# Patient Record
Sex: MALE | Race: WHITE | NOT HISPANIC OR LATINO | Employment: OTHER | URBAN - METROPOLITAN AREA
[De-identification: names, ages, dates, MRNs, and addresses within clinical notes are randomized per-mention and may not be internally consistent; named-entity substitution may affect disease eponyms.]

---

## 2023-06-13 ENCOUNTER — APPOINTMENT (OUTPATIENT)
Dept: NON INVASIVE DIAGNOSTICS | Facility: HOSPITAL | Age: 63
End: 2023-06-13
Payer: COMMERCIAL

## 2023-06-13 ENCOUNTER — HOSPITAL ENCOUNTER (OUTPATIENT)
Facility: HOSPITAL | Age: 63
Setting detail: OBSERVATION
Discharge: HOME | End: 2023-06-13
Attending: EMERGENCY MEDICINE | Admitting: HOSPITALIST
Payer: COMMERCIAL

## 2023-06-13 ENCOUNTER — APPOINTMENT (EMERGENCY)
Dept: RADIOLOGY | Facility: HOSPITAL | Age: 63
End: 2023-06-13
Payer: COMMERCIAL

## 2023-06-13 VITALS
HEART RATE: 98 BPM | SYSTOLIC BLOOD PRESSURE: 116 MMHG | TEMPERATURE: 98.2 F | BODY MASS INDEX: 26.95 KG/M2 | WEIGHT: 210 LBS | RESPIRATION RATE: 18 BRPM | DIASTOLIC BLOOD PRESSURE: 92 MMHG | OXYGEN SATURATION: 95 % | HEIGHT: 74 IN

## 2023-06-13 DIAGNOSIS — I48.91 ATRIAL FIBRILLATION WITH RAPID VENTRICULAR RESPONSE (HCC): Primary | ICD-10-CM

## 2023-06-13 LAB
2HR DELTA HS TROPONIN: 1 NG/L
4HR DELTA HS TROPONIN: 1 NG/L
ALBUMIN SERPL BCP-MCNC: 4.3 G/DL (ref 3.5–5)
ALP SERPL-CCNC: 53 U/L (ref 34–104)
ALT SERPL W P-5'-P-CCNC: 35 U/L (ref 7–52)
ANION GAP SERPL CALCULATED.3IONS-SCNC: 6 MMOL/L (ref 4–13)
ANION GAP SERPL CALCULATED.3IONS-SCNC: 8 MMOL/L (ref 4–13)
AORTIC ROOT: 3.5 CM
AORTIC VALVE MEAN VELOCITY: 7 M/S
APICAL FOUR CHAMBER EJECTION FRACTION: 61 %
ASCENDING AORTA: 3.2 CM
AST SERPL W P-5'-P-CCNC: 30 U/L (ref 13–39)
ATRIAL RATE: 120 BPM
AV AREA BY CONTINUOUS VTI: 2.5 CM2
AV AREA PEAK VELOCITY: 2.8 CM2
AV LVOT MEAN GRADIENT: 2 MMHG
AV LVOT PEAK GRADIENT: 4 MMHG
AV MEAN GRADIENT: 3 MMHG
AV PEAK GRADIENT: 5 MMHG
AV VALVE AREA: 2.48 CM2
AV VELOCITY RATIO: 0.9
BASOPHILS # BLD AUTO: 0.04 THOUSANDS/ÂΜL (ref 0–0.1)
BASOPHILS NFR BLD AUTO: 1 % (ref 0–1)
BILIRUB SERPL-MCNC: 0.48 MG/DL (ref 0.2–1)
BUN SERPL-MCNC: 17 MG/DL (ref 5–25)
BUN SERPL-MCNC: 18 MG/DL (ref 5–25)
CALCIUM SERPL-MCNC: 8.2 MG/DL (ref 8.4–10.2)
CALCIUM SERPL-MCNC: 8.8 MG/DL (ref 8.4–10.2)
CARDIAC TROPONIN I PNL SERPL HS: 4 NG/L
CARDIAC TROPONIN I PNL SERPL HS: 5 NG/L
CARDIAC TROPONIN I PNL SERPL HS: 5 NG/L
CHLORIDE SERPL-SCNC: 107 MMOL/L (ref 96–108)
CHLORIDE SERPL-SCNC: 112 MMOL/L (ref 96–108)
CO2 SERPL-SCNC: 22 MMOL/L (ref 21–32)
CO2 SERPL-SCNC: 25 MMOL/L (ref 21–32)
CREAT SERPL-MCNC: 0.89 MG/DL (ref 0.6–1.3)
CREAT SERPL-MCNC: 1.19 MG/DL (ref 0.6–1.3)
DOP CALC AO PEAK VEL: 1.14 M/S
DOP CALC AO VTI: 21.02 CM
DOP CALC LVOT AREA: 3.14 CM2
DOP CALC LVOT DIAMETER: 2 CM
DOP CALC LVOT PEAK VEL VTI: 16.58 CM
DOP CALC LVOT PEAK VEL: 1.03 M/S
DOP CALC LVOT STROKE INDEX: 22.5 ML/M2
DOP CALC LVOT STROKE VOLUME: 52.06
EOSINOPHIL # BLD AUTO: 0.09 THOUSAND/ÂΜL (ref 0–0.61)
EOSINOPHIL NFR BLD AUTO: 2 % (ref 0–6)
ERYTHROCYTE [DISTWIDTH] IN BLOOD BY AUTOMATED COUNT: 13.2 % (ref 11.6–15.1)
FRACTIONAL SHORTENING: 35 (ref 28–44)
GFR SERPL CREATININE-BSD FRML MDRD: 64 ML/MIN/1.73SQ M
GFR SERPL CREATININE-BSD FRML MDRD: 90 ML/MIN/1.73SQ M
GLUCOSE SERPL-MCNC: 118 MG/DL (ref 65–140)
GLUCOSE SERPL-MCNC: 99 MG/DL (ref 65–140)
HCT VFR BLD AUTO: 46.7 % (ref 36.5–49.3)
HGB BLD-MCNC: 16.2 G/DL (ref 12–17)
IMM GRANULOCYTES # BLD AUTO: 0.01 THOUSAND/UL (ref 0–0.2)
IMM GRANULOCYTES NFR BLD AUTO: 0 % (ref 0–2)
INTERVENTRICULAR SEPTUM IN DIASTOLE (PARASTERNAL SHORT AXIS VIEW): 1.2 CM
INTERVENTRICULAR SEPTUM: 1.2 CM (ref 0.6–1.1)
LAAS-AP2: 20.8 CM2
LAAS-AP4: 19.8 CM2
LEFT ATRIUM SIZE: 3.5 CM
LEFT INTERNAL DIMENSION IN SYSTOLE: 2.8 CM (ref 2.1–4)
LEFT VENTRICULAR INTERNAL DIMENSION IN DIASTOLE: 4.3 CM (ref 3.5–6)
LEFT VENTRICULAR POSTERIOR WALL IN END DIASTOLE: 1.1 CM
LEFT VENTRICULAR STROKE VOLUME: 51 ML
LVSV (TEICH): 51 ML
LYMPHOCYTES # BLD AUTO: 2.41 THOUSANDS/ÂΜL (ref 0.6–4.47)
LYMPHOCYTES NFR BLD AUTO: 40 % (ref 14–44)
MAGNESIUM SERPL-MCNC: 2.3 MG/DL (ref 1.9–2.7)
MCH RBC QN AUTO: 31.2 PG (ref 26.8–34.3)
MCHC RBC AUTO-ENTMCNC: 34.7 G/DL (ref 31.4–37.4)
MCV RBC AUTO: 90 FL (ref 82–98)
MONOCYTES # BLD AUTO: 0.5 THOUSAND/ÂΜL (ref 0.17–1.22)
MONOCYTES NFR BLD AUTO: 8 % (ref 4–12)
NEUTROPHILS # BLD AUTO: 2.99 THOUSANDS/ÂΜL (ref 1.85–7.62)
NEUTS SEG NFR BLD AUTO: 49 % (ref 43–75)
NRBC BLD AUTO-RTO: 0 /100 WBCS
PLATELET # BLD AUTO: 207 THOUSANDS/UL (ref 149–390)
PMV BLD AUTO: 9.9 FL (ref 8.9–12.7)
POTASSIUM SERPL-SCNC: 3.5 MMOL/L (ref 3.5–5.3)
POTASSIUM SERPL-SCNC: 4.2 MMOL/L (ref 3.5–5.3)
PROT SERPL-MCNC: 6.8 G/DL (ref 6.4–8.4)
QRS AXIS: 59 DEGREES
QRSD INTERVAL: 96 MS
QT INTERVAL: 296 MS
QTC INTERVAL: 425 MS
RBC # BLD AUTO: 5.2 MILLION/UL (ref 3.88–5.62)
RIGHT ATRIUM AREA SYSTOLE A4C: 14.7 CM2
RIGHT VENTRICLE ID DIMENSION: 3.2 CM
SL CV LEFT ATRIUM LENGTH A2C: 5.8 CM
SL CV LV EF: 60
SL CV PED ECHO LEFT VENTRICLE DIASTOLIC VOLUME (MOD BIPLANE) 2D: 81 ML
SL CV PED ECHO LEFT VENTRICLE SYSTOLIC VOLUME (MOD BIPLANE) 2D: 30 ML
SODIUM SERPL-SCNC: 140 MMOL/L (ref 135–147)
SODIUM SERPL-SCNC: 140 MMOL/L (ref 135–147)
T WAVE AXIS: 36 DEGREES
TR MAX PG: 21 MMHG
TR PEAK VELOCITY: 2.3 M/S
TRICUSPID ANNULAR PLANE SYSTOLIC EXCURSION: 1.9 CM
TRICUSPID VALVE PEAK REGURGITATION VELOCITY: 2.27 M/S
TSH SERPL DL<=0.05 MIU/L-ACNC: 3.86 UIU/ML (ref 0.45–4.5)
VENTRICULAR RATE: 124 BPM
WBC # BLD AUTO: 6.04 THOUSAND/UL (ref 4.31–10.16)

## 2023-06-13 PROCEDURE — 99285 EMERGENCY DEPT VISIT HI MDM: CPT

## 2023-06-13 PROCEDURE — 93306 TTE W/DOPPLER COMPLETE: CPT | Performed by: INTERNAL MEDICINE

## 2023-06-13 PROCEDURE — 96361 HYDRATE IV INFUSION ADD-ON: CPT

## 2023-06-13 PROCEDURE — 80048 BASIC METABOLIC PNL TOTAL CA: CPT | Performed by: PHYSICIAN ASSISTANT

## 2023-06-13 PROCEDURE — 83735 ASSAY OF MAGNESIUM: CPT | Performed by: EMERGENCY MEDICINE

## 2023-06-13 PROCEDURE — 93005 ELECTROCARDIOGRAM TRACING: CPT

## 2023-06-13 PROCEDURE — 84443 ASSAY THYROID STIM HORMONE: CPT | Performed by: PHYSICIAN ASSISTANT

## 2023-06-13 PROCEDURE — 99223 1ST HOSP IP/OBS HIGH 75: CPT | Performed by: HOSPITALIST

## 2023-06-13 PROCEDURE — 36415 COLL VENOUS BLD VENIPUNCTURE: CPT

## 2023-06-13 PROCEDURE — 71046 X-RAY EXAM CHEST 2 VIEWS: CPT

## 2023-06-13 PROCEDURE — 99245 OFF/OP CONSLTJ NEW/EST HI 55: CPT | Performed by: INTERNAL MEDICINE

## 2023-06-13 PROCEDURE — 93306 TTE W/DOPPLER COMPLETE: CPT

## 2023-06-13 PROCEDURE — 80053 COMPREHEN METABOLIC PANEL: CPT | Performed by: EMERGENCY MEDICINE

## 2023-06-13 PROCEDURE — 85025 COMPLETE CBC W/AUTO DIFF WBC: CPT | Performed by: EMERGENCY MEDICINE

## 2023-06-13 PROCEDURE — NC001 PR NO CHARGE: Performed by: HOSPITALIST

## 2023-06-13 PROCEDURE — 93010 ELECTROCARDIOGRAM REPORT: CPT | Performed by: INTERNAL MEDICINE

## 2023-06-13 PROCEDURE — 84484 ASSAY OF TROPONIN QUANT: CPT | Performed by: EMERGENCY MEDICINE

## 2023-06-13 PROCEDURE — 96360 HYDRATION IV INFUSION INIT: CPT

## 2023-06-13 RX ORDER — ONDANSETRON 2 MG/ML
4 INJECTION INTRAMUSCULAR; INTRAVENOUS EVERY 6 HOURS PRN
Status: DISCONTINUED | OUTPATIENT
Start: 2023-06-13 | End: 2023-06-13 | Stop reason: HOSPADM

## 2023-06-13 RX ORDER — ENOXAPARIN SODIUM 100 MG/ML
40 INJECTION SUBCUTANEOUS DAILY
Status: DISCONTINUED | OUTPATIENT
Start: 2023-06-13 | End: 2023-06-13

## 2023-06-13 RX ORDER — METOPROLOL SUCCINATE 50 MG/1
50 TABLET, EXTENDED RELEASE ORAL DAILY
Qty: 30 TABLET | Refills: 0 | Status: SHIPPED | OUTPATIENT
Start: 2023-06-14 | End: 2023-07-14

## 2023-06-13 RX ORDER — ACETAMINOPHEN 325 MG/1
650 TABLET ORAL EVERY 4 HOURS PRN
Status: DISCONTINUED | OUTPATIENT
Start: 2023-06-13 | End: 2023-06-13 | Stop reason: HOSPADM

## 2023-06-13 RX ORDER — SODIUM CHLORIDE 9 MG/ML
125 INJECTION, SOLUTION INTRAVENOUS CONTINUOUS
Status: DISCONTINUED | OUTPATIENT
Start: 2023-06-13 | End: 2023-06-13

## 2023-06-13 RX ORDER — POTASSIUM CHLORIDE 20 MEQ/1
40 TABLET, EXTENDED RELEASE ORAL ONCE
Status: COMPLETED | OUTPATIENT
Start: 2023-06-13 | End: 2023-06-13

## 2023-06-13 RX ORDER — METOPROLOL SUCCINATE 50 MG/1
50 TABLET, EXTENDED RELEASE ORAL DAILY
Status: DISCONTINUED | OUTPATIENT
Start: 2023-06-13 | End: 2023-06-13 | Stop reason: HOSPADM

## 2023-06-13 RX ADMIN — POTASSIUM CHLORIDE 40 MEQ: 1500 TABLET, EXTENDED RELEASE ORAL at 04:26

## 2023-06-13 RX ADMIN — METOPROLOL SUCCINATE 50 MG: 50 TABLET, EXTENDED RELEASE ORAL at 10:24

## 2023-06-13 RX ADMIN — SODIUM CHLORIDE 1000 ML: 0.9 INJECTION, SOLUTION INTRAVENOUS at 02:20

## 2023-06-13 RX ADMIN — ENOXAPARIN SODIUM 40 MG: 40 INJECTION SUBCUTANEOUS at 09:10

## 2023-06-13 RX ADMIN — SODIUM CHLORIDE 125 ML/HR: 0.9 INJECTION, SOLUTION INTRAVENOUS at 04:23

## 2023-06-13 NOTE — CASE MANAGEMENT
Case Management Progress Note    Patient name Makeda Brantley  Location ED 23/ED 23 MRN [id number]  : 1960 Date 2023       LOS (days): 0  Geometric Mean LOS (GMLOS) (days):   Days to GMLOS:        OBJECTIVE:        Current admission status: Observation  Preferred Pharmacy:   [address] [place] [address]  Phone: [phone] Fax: [phone]    Primary Care Provider: No primary care provider on file  Primary Insurance: BLUE CROSS  Secondary Insurance:     PROGRESS NOTE:    Message from [address] that patient is medically cleared and will need Eliquis  Call to [facility] cost is $47 00 a month  Met with patient at bedside in ED and discussed Eliquis and provided with $10 coupon  Spouse will transport home    No other discharge needs

## 2023-06-13 NOTE — DISCHARGE INSTR - AVS FIRST PAGE
Dear Silva Ledezma,     It was our pleasure to care for you here at [facility], [address] [place] [address]  It is our hope that we were always able to exceed the expected standards for your care during your stay  You were hospitalized due to atrial fibrillation with rapid ventricular response  You were cared for on the medical/surgical floor by Anabel Tsang MD with the [facility] who covers for your primary care physician (PCP), No primary care provider on file  , while you were hospitalized  You were additionally seen by the [place] [address] [facility]-Dr Gregorio Cormier  If you have any questions or concerns related to this hospitalization, you may contact us at [phone]  For follow up as well as any medication refills, we recommend that you follow up with your primary care physician  A registered nurse will reach out to you by phone within a few days after your discharge to answer any additional questions that you may have after going home  However, at this time we provide for you here, the most important instructions / recommendations at discharge:     Notable Medication Adjustments -   New prescription Toprol-XL-50 mg-take 1 tablet once a day  New prescription Eliquis-5 mg-take 1 tablet twice a  Testing Required after Discharge -   To be further determined in the near future in the outpatient setting by cardiology  Important follow up information -   Please follow-up with the providers as outlined in this discharge packet  Other Instructions -   Please maintain a healthy low-sodium diet  Please review this entire after visit summary as additional general instructions including medication list, appointments, activity, diet, any pertinent wound care, and other additional recommendations from your care team that may be provided for you        Sincerely,     Anabel Tsang MD

## 2023-06-13 NOTE — CONSULTS
[address]  Consult  Name: Theodore Bashir 60 y o  male I MRN: [id number]  Unit/Bed#: ED 23 I Date of Admission: 6/13/2023   Date of Service: 6/13/2023 I Hospital Day: 0    Inpatient consult to Cardiology  Consult performed by: FIFI Pavon  Consult ordered by: Olimpia Nuñez PA-C          Assessment/Plan   * Atrial fibrillation with RVR [facility])  Assessment & Plan  Rate now well controlled without medications  Labs unremarkable for electrolyte abnormalities, infection, thyroid disease  Trop levels normal, Echo without wma    Start metoprolol 50 mg daily   Chads Vasc 0 low stroke risk  Recommend short term anticoagulation with plan for CV vs ablation after outpatient reassessment in 1 month         Other summary comments: Ngoc Ariza remains in A-fib with a controlled ventricular response  Will initiate metoprolol 50 mg once daily for better rate control  Given his young age, we will consider cardioversion vs ablation after 4 weeks of uninterrupted anticoagulation  Eliquis 5 mg bid has been recommended for the short term  Will make arrangements for outpatient follow up  Pt is stable for DC from a cardiac perspective  Outpatient Cardiologist: None    HPI: Theodore Bashir is a 60y o  year old male who presented with afib  Ngoc Ariza has a remote history of A-fib, though by his description, it may have been SVT  Ngoc Ariza noted palpitations after returning to bed after using the bathroom  He has a cardia monitor and checked his heart rate and was noted to be in A-fib  Other than palpitations, he denied any other symptoms  It is for this reason he presented for evaluation  At the time of my evaluation, he notes mild palpitations  He denies any chest pain or recent episodes of chest discomfort, changes in stamina/functional capacity, exertional dyspnea  He denies dizziness, lightheadedness, syncope  He denies any recent changes in medication    He "does consume a lot of caffeine, but denies any extra yesterday  He consumes 2 beers most days of the week and denies excess alcohol intake recently  He does occasionally take a decongestant for allergies  EKG: A-fib with RVR, PVCs or aberrantly conducted complexes      MOST  RECENT CARDIAC IMAGING:   Echo 6/13/2023  EF 60%  Mild LAE  Mild MR, TR      Review of Systems: a 10 point review of systems was conducted and is negative except for as mentioned in the HPI or as below  Historical Information   Past Medical History:   Diagnosis Date   • Atrial fibrillation [facility])      History reviewed  No pertinent surgical history  Social History     Substance and Sexual Activity   Alcohol Use Not Currently     Social History     Substance and Sexual Activity   Drug Use Not Currently     Social History     Tobacco Use   Smoking Status Never   Smokeless Tobacco Never       Family History: family history of CAD      Meds/Allergies   all current active meds have been reviewed  (Not in a hospital admission)      No Known Allergies    Objective   Vitals: Blood pressure 113/80, pulse 104, temperature 98 2 °F (36 8 °C), temperature source Oral, resp  rate 16, height 6' 2\" (1 88 m), weight 95 3 kg (210 lb), SpO2 96 %  , Body mass index is 26 96 kg/m² ,   Orthostatic Blood Pressures    Flowsheet Row Most Recent Value   Blood Pressure 113/80 filed at 06/13/2023 0840   Patient Position - Orthostatic VS Lying filed at 06/13/2023 3079          Systolic (23OFB), ZLE:808 , Min:111 , ADQ:788     Diastolic (59WMR), EEL:79, Min:63, Max:81    Physical Exam:    General:  Normal appearance in no distress  Eyes:  Anicteric  Oral mucosa:  Moist   Neck:  No JVD  Carotid upstrokes are brisk without bruits  No masses  Chest:  Clear to auscultation  Cardiac:  Irregularly irregular  No palpable PMI  Normal S1 and S2  No murmur gallop or rub  Abdomen:  Soft and nontender  No palpable organomegaly or aortic enlargement    Extremities: " No peripheral edema  Musculoskeletal:  Symmetric  Vascular:  Femoral pulses are brisk without bruits  Popliteal  pulses are intact bilaterally  Pedal pulses are intact  Neuro:  Grossly symmetric  Psych:  Alert and oriented x3          Lab Results:     Troponins:    Results from last 7 days   Lab Units 06/13/23  0559 06/13/23  0428 06/13/23  0147   HS TNI 0HR ng/L  --   --  4   HS TNI 2HR ng/L  --  5  --    HS TNI 4HR ng/L 5  --   --    HSTNI D2 ng/L  --  1  --    HSTNI D4 ng/L 1  --   --      BNP:       CBC :   Results from last 7 days   Lab Units 06/13/23  0147   HEMATOCRIT % 46 7   HEMOGLOBIN g/dL 16 2   MCV fL 90   PLATELETS Thousands/uL 207   WBC Thousand/uL 6 04     TSH:     CMP:   Results from last 7 days   Lab Units 06/13/23  0559 06/13/23  0147   ALT U/L  --  35   AST U/L  --  30   BUN mg/dL 17 18   CHLORIDE mmol/L 112* 107   CO2 mmol/L 22 25   CREATININE mg/dL 0 89 1 19   EGFR ml/min/1 73sq m 90 64   POTASSIUM mmol/L 4 2 3 5     Lipid Profile:     Coags:

## 2023-06-13 NOTE — ASSESSMENT & PLAN NOTE
· Resolved, now very well rate controlled  · 2D echocardiogram reviewed-EF of 60%, no regional wall motion abnormalities, no significant structural valvular abnormality noted  · Status post a cardiology evaluation  · ACS has been ruled out  · TSH is within normal limits  · Patient is medically stable for discharge  · DC home on Toprol-XL for rate control  · DC home on apixaban for anticoagulation  · Patient to follow-up with cardiology in the near future in the outpatient setting for ablation evaluation  · Of note, the patient does not have a PCP, patient was counseled on the importance of establishing care with a local PCP to ensure he gets routine follow-up  · Patient verbalized understanding

## 2023-06-13 NOTE — ED PROVIDER NOTES
History  Chief Complaint   Patient presents with   • Rapid Heart Rate     Patient reports feeling rapid heart rate around 0000 today  Patient denies chest pain        60YEAR-OLD MALE     Pt brought by wife Lalit Soto, at bedside       PMH:   Afib 10 years ago, that was self limited and went away    Pt started w/ palpitations about 2 hours ago  He had just got up from sleep to pee and suddenly felt heart racing, felt like when he had episode of Afib 10 years ago     No syncope or near syncope    No cp/pressure  Denies Pain radiating to the Jaw/neck/back or arm     HE HAS NO SOB  PLEURISY, NO DIAPHORESIS    NO RECENT LONG CAR RIDES OR PLAN RIDES  NO H/O PE OR DVT  No leg swelling, no calf pain   NO RECENT TRAUMA OR SURGERY  NO HEMOPTYSIS      DENIES ETOH OR DRUG USE  DENIES STIMULANT USE  EXCESSIVE CAFFEINE USE, which is typical for him  He usually has 1-3 cups of coffee daily       ASSOCIATED SYMPTOMS:  URINARY  SYMPTOMS: THERE IS NO DYSURIA, NO HEMATURIA, NO FREQUENCY  NO ABDOMINAL PAIN   NO ACUTE BACK PAIN - REPORTS CHRONIC LBP     HE DENIES NAUSEA, BUT DENIES ANY VOMITING  DENIES FEVERS, CHILLS    DENIES LOOSE STOOLS, NO DIARRHEA, NO BLOODY STOOLS - NOT BLACK OR BLOODY      ALLEVIATING OR EXACERBATING FACTORS:  UNCERTAIN      INTERVENTIONS: NONE            History provided by:  Patient  Palpitations  Palpitations quality:  Irregular  Onset quality:  Sudden  Timing:  Constant  Progression:  Unchanged  Chronicity:  New  Relieved by:  Nothing  Worsened by:  Nothing  Ineffective treatments:  None tried  Associated symptoms: no back pain, no chest pain, no chest pressure, no cough, no diaphoresis, no dizziness, no hemoptysis, no leg pain, no lower extremity edema, no malaise/fatigue, no nausea, no near-syncope, no numbness, no PND, no shortness of breath, no syncope, no vomiting and no weakness        None       Past Medical History:   Diagnosis Date   • Atrial fibrillation ([facility] [address] )        History reviewed   No pertinent surgical history  History reviewed  No pertinent family history  I have reviewed and agree with the history as documented  E-Cigarette/Vaping     E-Cigarette/Vaping Substances     Social History     Tobacco Use   • Smoking status: Never   • Smokeless tobacco: Never   Substance Use Topics   • Alcohol use: Not Currently   • Drug use: Not Currently       Review of Systems   Constitutional: Negative for chills, diaphoresis, fatigue, fever and malaise/fatigue  HENT: Negative for congestion, ear discharge, ear pain, postnasal drip, rhinorrhea, sinus pressure, sinus pain, sneezing, sore throat, trouble swallowing and voice change  Respiratory: Negative for cough, hemoptysis, shortness of breath, wheezing and stridor  Cardiovascular: Positive for palpitations  Negative for chest pain, leg swelling, syncope, PND and near-syncope  Gastrointestinal: Negative for abdominal pain, blood in stool, constipation, nausea and vomiting  Genitourinary: Negative for difficulty urinating, dysuria, flank pain and frequency  Musculoskeletal: Negative for arthralgias, back pain, gait problem, joint swelling, myalgias, neck pain and neck stiffness  Skin: Negative for rash and wound  Neurological: Negative for dizziness, weakness, light-headedness, numbness and headaches  All other systems reviewed and are negative  Physical Exam  Physical Exam  Constitutional:       General: He is not in acute distress  Appearance: Normal appearance  He is well-developed  He is not ill-appearing, toxic-appearing or diaphoretic  HENT:      Head: Normocephalic and atraumatic  Right Ear: External ear normal       Left Ear: External ear normal       Nose: Nose normal  No congestion or rhinorrhea  Mouth/Throat:      Pharynx: No oropharyngeal exudate or posterior oropharyngeal erythema  Eyes:      General: No scleral icterus  Right eye: No discharge  Left eye: No discharge        Extraocular Movements: Extraocular movements intact  Conjunctiva/sclera: Conjunctivae normal       Pupils: Pupils are equal, round, and reactive to light  Neck:      Vascular: No JVD  Trachea: No tracheal deviation  Cardiovascular:      Rate and Rhythm: Tachycardia present  Rhythm irregular  Pulses: Normal pulses  Heart sounds: Normal heart sounds  No murmur heard  No friction rub  No gallop  Pulmonary:      Effort: Pulmonary effort is normal  No respiratory distress  Breath sounds: Normal breath sounds  No stridor  No wheezing, rhonchi or rales  Chest:      Chest wall: No tenderness  Abdominal:      General: Bowel sounds are normal  There is no distension  Palpations: Abdomen is soft  There is no mass  Tenderness: There is no abdominal tenderness  There is no right CVA tenderness, left CVA tenderness, guarding or rebound  Hernia: No hernia is present  Musculoskeletal:         General: No swelling, tenderness, deformity or signs of injury  Normal range of motion  Cervical back: Normal range of motion and neck supple  No rigidity or tenderness  Right lower leg: No edema  Left lower leg: No edema  Lymphadenopathy:      Cervical: No cervical adenopathy  Skin:     General: Skin is warm  Capillary Refill: Capillary refill takes less than 2 seconds  Coloration: Skin is not jaundiced or pale  Findings: No bruising, erythema, lesion or rash  Neurological:      General: No focal deficit present  Mental Status: He is alert and oriented to person, place, and time  Mental status is at baseline  Cranial Nerves: No cranial nerve deficit  Sensory: No sensory deficit  Motor: No weakness or abnormal muscle tone  Coordination: Coordination normal    Psychiatric:         Mood and Affect: Mood normal          Behavior: Behavior normal          Thought Content:  Thought content normal          Judgment: Judgment normal          Vital Signs  ED Triage Vitals [06/13/23 0143]   Temperature Pulse Respirations Blood Pressure SpO2   98 2 °F (36 8 °C) (!) 109 18 126/63 96 %      Temp Source Heart Rate Source Patient Position - Orthostatic VS BP Location FiO2 (%)   Oral Monitor Lying Left arm --      Pain Score       No Pain           Vitals:    06/13/23 0143 06/13/23 0300   BP: 126/63 111/81   Pulse: (!) 109 100   Patient Position - Orthostatic VS: Lying          Visual Acuity      ED Medications  Medications   acetaminophen (TYLENOL) tablet 650 mg (has no administration in time range)   ondansetron (ZOFRAN) injection 4 mg (has no administration in time range)   sodium chloride 0 9 % infusion (125 mL/hr Intravenous New Bag 6/13/23 0423)   enoxaparin (LOVENOX) subcutaneous injection 40 mg (has no administration in time range)   sodium chloride 0 9 % bolus 1,000 mL (0 mL Intravenous Stopped 6/13/23 0410)   potassium chloride (K-DUR,KLOR-CON) CR tablet 40 mEq (40 mEq Oral Given 6/13/23 0426)       Diagnostic Studies  Results Reviewed     Procedure Component Value Units Date/Time    HS Troponin I 2hr [[id number]]  (Normal) Collected: 06/13/23 0428    Lab Status: Final result Specimen: Blood from Line, Venous Updated: 06/13/23 0500     hs TnI 2hr 5 ng/L      Delta 2hr hsTnI 1 ng/L     TSH, 3rd generation with Free T4 reflex [[id number]]  (Normal) Collected: 06/13/23 0147    Lab Status: Final result Specimen: Blood from Arm, Right Updated: 06/13/23 0446     TSH 3RD GENERATON 3 863 uIU/mL     Basic metabolic panel [[id number]]     Lab Status: No result Specimen: Blood     HS Troponin I 4hr [[id number]]     Lab Status: No result Specimen: Blood     Magnesium [[id number]]  (Normal) Collected: 06/13/23 0147    Lab Status: Final result Specimen: Blood from Arm, Right Updated: 06/13/23 0244     Magnesium 2 3 mg/dL     HS Troponin 0hr (reflex protocol) [[id number]]  (Normal) Collected: 06/13/23 0147    Lab Status: Final result Specimen: Blood from Arm, Right Updated: 06/13/23 9256 hs TnI 0hr 4 ng/L     Comprehensive metabolic panel [[id number]] Collected: 06/13/23 0147    Lab Status: Final result Specimen: Blood from Arm, Right Updated: 06/13/23 0215     Sodium 140 mmol/L      Potassium 3 5 mmol/L      Chloride 107 mmol/L      CO2 25 mmol/L      ANION GAP 8 mmol/L      BUN 18 mg/dL      Creatinine 1 19 mg/dL      Glucose 99 mg/dL      Calcium 8 8 mg/dL      AST 30 U/L      ALT 35 U/L      Alkaline Phosphatase 53 U/L      Total Protein 6 8 g/dL      Albumin 4 3 g/dL      Total Bilirubin 0 48 mg/dL      eGFR 64 ml/min/1 73sq m     Narrative:      [place] guidelines for Chronic Kidney Disease (CKD):   •  Stage 1 with normal or high GFR (GFR > 90 mL/min/1 73 square meters)  •  Stage 2 Mild CKD (GFR = 60-89 mL/min/1 73 square meters)  •  Stage 3A Moderate CKD (GFR = 45-59 mL/min/1 73 square meters)  •  Stage 3B Moderate CKD (GFR = 30-44 mL/min/1 73 square meters)  •  Stage 4 Severe CKD (GFR = 15-29 mL/min/1 73 square meters)  •  Stage 5 End Stage CKD (GFR <15 mL/min/1 73 square meters)  Note: GFR calculation is accurate only with a steady state creatinine    CBC and differential [[id number]] Collected: 06/13/23 0147    Lab Status: Final result Specimen: Blood from Arm, Right Updated: 06/13/23 0155     WBC 6 04 Thousand/uL      RBC 5 20 Million/uL      Hemoglobin 16 2 g/dL      Hematocrit 46 7 %      MCV 90 fL      MCH 31 2 pg      MCHC 34 7 g/dL      RDW 13 2 %      MPV 9 9 fL      Platelets 371 Thousands/uL      nRBC 0 /100 WBCs      Neutrophils Relative 49 %      Immat GRANS % 0 %      Lymphocytes Relative 40 %      Monocytes Relative 8 %      Eosinophils Relative 2 %      Basophils Relative 1 %      Neutrophils Absolute 2 99 Thousands/µL      Immature Grans Absolute 0 01 Thousand/uL      Lymphocytes Absolute 2 41 Thousands/µL      Monocytes Absolute 0 50 Thousand/µL      Eosinophils Absolute 0 09 Thousand/µL      Basophils Absolute 0 04 Thousands/µL XR chest 2 views   ED Interpretation by Radha Tesfaye MD (06/13 0250)   COMPARISON: NONE    EXAM PERFORMED/VIEWS:  XR CHEST PA/LAT        FINDINGS:     Cardiomediastinal silhouette appears unremarkable      The lungs are clear  No pneumothorax or pleural effusion      Visualized osseous structures appear unremarkable for the patient's age      IMPRESSION:     No focal consolidation, pleural effusion, or pneumothorax                             Procedures  ECG 12 Lead Documentation Only    Date/Time: 6/13/2023 1:45 AM    Performed by: Radha Tesfaye MD  Authorized by: Radha Tesfaye MD    Indications / Diagnosis:  Palpitations   Patient location:  ED  Interpretation:     Interpretation: abnormal    Rate:     ECG rate:  124    ECG rate assessment: tachycardic    Rhythm:     Rhythm: atrial fibrillation    Ectopy:     Ectopy: none    QRS:     QRS axis:  Normal    QRS intervals:  Normal  Conduction:     Conduction: normal    ST segments:     ST segments:  Non-specific  T waves:     T waves: non-specific               ED Course  ED Course as of 06/13/23 0506   Tue Jun 13, 2023   0159 CBC and differential   0227 hs TnI 0hr: 4   0227 Comprehensive metabolic panel   5250 Magnesium: 2 3   0340 Tabitha accepts to Obs for new onset Afib    Pt aware of plan                                SBIRT 20yo+    Flowsheet Row Most Recent Value   Initial Alcohol Screen: US AUDIT-C     1  How often do you have a drink containing alcohol? 0 Filed at: 06/13/2023 0142   2  How many drinks containing alcohol do you have on a typical day you are drinking? 0 Filed at: 06/13/2023 0142   3a  Male UNDER 65: How often do you have five or more drinks on one occasion? 0 Filed at: 06/13/2023 0142   3b  FEMALE Any Age, or MALE 65+: How often do you have 4 or more drinks on one occassion? 0 Filed at: 06/13/2023 0142   Audit-C Score 0 Filed at: 06/13/2023 0142   ANH: How many times in the past year have you        Used an illegal drug or used a prescription medication for non-medical reasons? Never Filed at: 06/13/2023 0142                    Medical Decision Making      MDM    Patient with history as above presented with Patient presents with:  Rapid Heart Rate: Patient reports feeling rapid heart rate around 0000 today  Patient denies chest pain     History obtained from patient, spouse    Patient was nontoxic, stable  Exam as above    EKG reviewed  Labs reviewed  Independently reviewed CXR    Differential diagnosis considered  Overall presentation is consistent with new onset Atrial Fib w/ RVR  Low suspicion for ACS, sepsis  Patient was treated with IVF    Discussed case with TORSTEN, who agreed to admit patient for New onset Atrial Fib w/ RVR      This medical documentation was created using an electronic medical record system with [employer] voice recognition  Although this document has been carefully reviewed, there may still be some phonetic and typographical errors  These errors are purely typographical and due to imperfections of the software program, do not reflect any compromise in the patient's medical care  Atrial fibrillation with rapid ventricular response ([address] ): acute illness or injury  Amount and/or Complexity of Data Reviewed  Labs: ordered  Decision-making details documented in ED Course  Radiology: ordered and independent interpretation performed  Risk  Decision regarding hospitalization            Disposition  Final diagnoses:   Atrial fibrillation with rapid ventricular response ([address] )     Time reflects when diagnosis was documented in both MDM as applicable and the Disposition within this note     Time User Action Codes Description Comment    6/13/2023  3:58 AM [facility] [[id number]] Atrial fibrillation with rapid ventricular response [facility])       ED Disposition     ED Disposition   Admit    Condition   Stable    Date/Time   Tue Jun 13, 2023  3:58 AM    Comment   Case was discussed with Aldine Gottron and the patient's admission status was agreed to be Admission Status: observation status to the service of Dr Chacha Newman    Follow-up Information    None         Patient's Medications    No medications on file       No discharge procedures on file      PDMP Review     None          ED Provider  Electronically Signed by           Durga Sumner MD  06/13/23 6369

## 2023-06-13 NOTE — H&P
[facility] [address]  H&P  Name: Hortencia Granados 60 y o  male I MRN: [id number]  Unit/Bed#: ED 23 I Date of Admission: 6/13/2023   Date of Service: 6/13/2023 I Hospital Day: 0      Assessment/Plan   * Atrial fibrillation with RVR [facility])  Assessment & Plan  Patient woke from sleep to urinate felt heart racing similar to prior afib episode - EKG afib rate 124 in ED  Has prior hx of Afib that resolved  · Rate currently 100  · Check TSH  · Cardiology consult  · echo       VTE Pharmacologic Prophylaxis: VTE Score: 3 Moderate Risk (Score 3-4) - Pharmacological DVT Prophylaxis Ordered: enoxaparin (Lovenox)  Code Status: Level 1 - Full Code   Discussion with patient    Anticipated Length of Stay: Patient will be admitted on an observation basis with an anticipated length of stay of less than 2 midnights secondary to specialist input, HR monitoring  Total Time Spent on Date of Encounter in care of patient: 75 minutes This time was spent on one or more of the following: performing physical exam; counseling and coordination of care; obtaining or reviewing history; documenting in the medical record; reviewing/ordering tests, medications or procedures; communicating with other healthcare professionals and discussing with patient's family/caregivers  Chief Complaint: heart racing    History of Present Illness:  Hortencia Granados is a 60 y o  male with a PMH of Paroxysmal AFib who presents with heart racing  Patient reports he got up to go to the bathroom, felt that he had Afib, similar to 14-15 years ago  No sleep apnea  No chest pain or SOB  He has a home cardio-mobile device and has been normal until tonight  When he had the last episode he was given medication and then his heart rate went down to 20 and then when HR improved he had normal rhythm  Review of Systems:  Review of Systems   Constitutional: Negative for chills and fever  HENT: Negative for rhinorrhea, sore throat and trouble swallowing  "  Eyes: Negative for discharge and redness  Respiratory: Negative for cough and shortness of breath  Cardiovascular: Positive for palpitations  Negative for chest pain  Gastrointestinal: Negative for abdominal pain, diarrhea, nausea and vomiting  Genitourinary: Negative for dysuria and hematuria  Musculoskeletal: Negative for back pain and neck pain  Skin: Negative for rash and wound  Neurological: Negative for dizziness, weakness and headaches  Psychiatric/Behavioral: Negative for agitation and confusion  Past Medical and Surgical History:   Past Medical History:   Diagnosis Date   • Atrial fibrillation [facility])        History reviewed  No pertinent surgical history  Meds/Allergies:  Prior to Admission medications    Not on File     I have reviewed home medications with patient personally  Allergies: No Known Allergies    Social History:  Marital Status: /Civil Union   Occupation: semi retired  Patient Pre-hospital Living Situation: With spouse  Patient Pre-hospital Level of Mobility: walks  Patient Pre-hospital Diet Restrictions: None  Substance Use History:   Social History     Substance and Sexual Activity   Alcohol Use Not Currently     Social History     Tobacco Use   Smoking Status Never   Smokeless Tobacco Never     Social History     Substance and Sexual Activity   Drug Use Not Currently       Family History:  Family History   Problem Relation Age of Onset   • Scleroderma Mother    • COPD Father        Physical Exam:     Vitals:   Blood Pressure: 111/81 (06/13/23 0300)  Pulse: 100 (06/13/23 0300)  Temperature: 98 2 °F (36 8 °C) (06/13/23 0143)  Temp Source: Oral (06/13/23 0143)  Respirations: 17 (06/13/23 0300)  Height: 6' 2\" (188 cm) (06/13/23 0143)  Weight - Scale: 95 3 kg (210 lb) (06/13/23 0143)  SpO2: 95 % (06/13/23 0300)    Physical Exam  Vitals reviewed  Constitutional:       General: He is not in acute distress  Appearance: Normal appearance     HENT:      Head: " Normocephalic and atraumatic  Right Ear: External ear normal       Left Ear: External ear normal       Nose: Nose normal    Eyes:      General:         Right eye: No discharge  Left eye: No discharge  Conjunctiva/sclera: Conjunctivae normal       Comments: Glasses in place   Cardiovascular:      Rate and Rhythm: Normal rate  Rhythm irregular  Heart sounds: Normal heart sounds  No murmur heard  Pulmonary:      Effort: Pulmonary effort is normal  No respiratory distress  Breath sounds: Normal breath sounds  No wheezing or rales  Abdominal:      General: Bowel sounds are normal  There is no distension  Palpations: Abdomen is soft  Tenderness: There is no abdominal tenderness  There is no guarding  Musculoskeletal:         General: Normal range of motion  Cervical back: Normal range of motion  Skin:     General: Skin is warm and dry  Neurological:      Mental Status: He is alert and oriented to person, place, and time  Mental status is at baseline  Psychiatric:         Mood and Affect: Mood normal          Behavior: Behavior normal          Thought Content:  Thought content normal          Judgment: Judgment normal             Additional Data:     Lab Results:  Results from last 7 days   Lab Units 06/13/23  0147   EOS PCT % 2   HEMATOCRIT % 46 7   HEMOGLOBIN g/dL 16 2   LYMPHS PCT % 40   MONOS PCT % 8   NEUTROS PCT % 49   PLATELETS Thousands/uL 207   WBC Thousand/uL 6 04     Results from last 7 days   Lab Units 06/13/23  0147   ANION GAP mmol/L 8   ALBUMIN g/dL 4 3   ALK PHOS U/L 53   ALT U/L 35   AST U/L 30   BUN mg/dL 18   CALCIUM mg/dL 8 8   CHLORIDE mmol/L 107   CO2 mmol/L 25   CREATININE mg/dL 1 19   GLUCOSE RANDOM mg/dL 99   POTASSIUM mmol/L 3 5   SODIUM mmol/L 140   TOTAL BILIRUBIN mg/dL 0 48         Lines/Drains:  Invasive Devices     Peripheral Intravenous Line  Duration           Peripheral IV 06/13/23 Distal;Right;Upper;Ventral (anterior) Arm <1 day Imaging: CXR NAD my read, formal read pending  XR chest 2 views   ED Interpretation by Dane Naqvi MD (06/13 0250)   COMPARISON: NONE    EXAM PERFORMED/VIEWS:  XR CHEST PA/LAT        FINDINGS:     Cardiomediastinal silhouette appears unremarkable      The lungs are clear  No pneumothorax or pleural effusion      Visualized osseous structures appear unremarkable for the patient's age      IMPRESSION:     No focal consolidation, pleural effusion, or pneumothorax                      EKG and Other Studies Reviewed on Admission:   · EKG: Atrial fibrillation   reviewed in MUSE personally  ** Please Note: This note has been constructed using a voice recognition system   **

## 2023-06-13 NOTE — DISCHARGE SUMMARY
[address] 44  Discharge- Remedios Walls 1960, 60 y o  male MRN: [id number]  Unit/Bed#: ED 23 Encounter: [id number]  Primary Care Provider: No primary care provider on file  Date and time admitted to hospital: 6/13/2023  1:38 AM    * Atrial fibrillation with RVR (HCC)  Assessment & Plan  · Resolved, now very well rate controlled  · 2D echocardiogram reviewed-EF of 60%, no regional wall motion abnormalities, no significant structural valvular abnormality noted  · Status post a cardiology evaluation  · ACS has been ruled out  · TSH is within normal limits  · Patient is medically stable for discharge  · DC home on Toprol-XL for rate control  · DC home on apixaban for anticoagulation  · Patient to follow-up with cardiology in the near future in the outpatient setting for ablation evaluation  · Of note, the patient does not have a PCP, patient was counseled on the importance of establishing care with a local PCP to ensure he gets routine follow-up  · Patient verbalized understanding        Discharging Physician / Practitioner: Tonja Aviles MD  PCP: No primary care provider on file  Admission Date:   Admission Orders (From admission, onward)     Ordered        06/13/23 0358  Place in Observation  Once                      Discharge Date: 06/13/23    Medical Problems     Resolved Problems  Date Reviewed: 6/13/2023   None         Consultations During Hospital Stay:  · Cardiology    Procedures Performed:   · None    Significant Findings / Test Results:   · Chest x-ray-preliminary read-grossly within normal limits  · 2D echocardiogram- EF of 60%-no regional wall motion abnormalities    Incidental Findings:   · None    Test Results Pending at Discharge (will require follow up): · None     Outpatient Tests Requested:  · None    Complications:    • None    Reason for Admission: Atrial fibrillation with rapid ventricular response    Hospital Course:     Remedios Walls is a 60 y o  "male patient who originally presented to the hospital on 6/13/2023 due to the feeling of his heart racing  Please refer to the initial history and physical examination completed by Jermain Alberto PA-C for the initial presenting features and complaints  In brief, the patient is a 59-year-old male that came into the emergency room with a chief complaint of palpitations  He was diagnosed with A-fib with RVR  He was treated with rate controlling medications, and was also started on Eliquis  A 2D echocardiogram was performed with results as outlined above  Patient was seen in conjunction with cardiology, cardiology felt that the patient was medically stable for discharge, they will be following up in the near future in the outpatient setting to evaluate him for an ablation procedure  Please refer to the assessment/plan portion of this discharge summary as outlined above for additional details regarding his stay  Please see above list of diagnoses and related plan for additional information  Condition at Discharge: good     Discharge Day Visit / Exam:     Subjective: Patient seen and examined, looks and feels well, no complaints is ready to go home  Vitals: Blood Pressure: 116/92 (06/13/23 1030)  Pulse: 98 (06/13/23 1030)  Temperature: 98 2 °F (36 8 °C) (06/13/23 0143)  Temp Source: Oral (06/13/23 0143)  Respirations: 18 (06/13/23 1030)  Height: 6' 2\" (188 cm) (06/13/23 0840)  Weight - Scale: 95 3 kg (210 lb) (06/13/23 0840)  SpO2: 95 % (06/13/23 1030)  Exam:   Physical Exam  Vitals and nursing note reviewed  Constitutional:       General: He is not in acute distress  Appearance: Normal appearance  He is not ill-appearing  HENT:      Head: Normocephalic and atraumatic  Nose: Nose normal    Eyes:      Extraocular Movements: Extraocular movements intact  Pupils: Pupils are equal, round, and reactive to light  Cardiovascular:      Rate and Rhythm: Normal rate and regular rhythm        " Pulses: Normal pulses  Heart sounds: Normal heart sounds  No murmur heard  No friction rub  No gallop  Pulmonary:      Effort: Pulmonary effort is normal       Breath sounds: Normal breath sounds  Abdominal:      General: There is no distension  Palpations: Abdomen is soft  There is no mass  Tenderness: There is no abdominal tenderness  There is no guarding or rebound  Musculoskeletal:         General: No swelling or tenderness  Normal range of motion  Cervical back: Normal range of motion and neck supple  No rigidity  No muscular tenderness  Right lower leg: No edema  Left lower leg: No edema  Skin:     General: Skin is warm  Capillary Refill: Capillary refill takes less than 2 seconds  Findings: No erythema or rash  Neurological:      General: No focal deficit present  Mental Status: He is alert and oriented to person, place, and time  Mental status is at baseline  Psychiatric:         Mood and Affect: Mood normal          Behavior: Behavior normal          Discussion with Family: No family members were present at the time of my discharge evaluation, nor did the patient ask and want me to call anyone    Discharge instructions/Information to patient and family:   See after visit summary for information provided to patient and family  Provisions for Follow-Up Care:  See after visit summary for information related to follow-up care and any pertinent home health orders  Disposition:     Home      Planned Readmission:   • None     Discharge Statement:  I spent 40 minutes discharging the patient  This time was spent on the day of discharge  I had direct contact with the patient on the day of discharge  Greater than 50% of the total time was spent examining patient, answering all patient questions, arranging and discussing plan of care with patient as well as directly providing post-discharge instructions    Additional time then spent on discharge activities  Discharge Medications:  See after visit summary for reconciled discharge medications provided to patient and family        ** Please Note: This note has been constructed using a voice recognition system **

## 2023-06-13 NOTE — ASSESSMENT & PLAN NOTE
Patient woke from sleep to urinate felt heart racing similar to prior afib episode - EKG afib rate 124 in ED   Has prior hx of Afib that resolved  · Rate currently 100  · Check TSH  · Cardiology consult  · echo

## 2023-06-13 NOTE — ASSESSMENT & PLAN NOTE
Rate now well controlled without medications  Labs unremarkable for electrolyte abnormalities, infection, thyroid disease  Trop levels normal, Echo without wma    Start metoprolol 50 mg daily   Chads Vasc 0 low stroke risk      Recommend short term anticoagulation with plan for CV vs ablation after outpatient reassessment in 1 month No

## 2023-07-12 ENCOUNTER — OFFICE VISIT (OUTPATIENT)
Dept: CARDIOLOGY CLINIC | Facility: CLINIC | Age: 63
End: 2023-07-12
Payer: COMMERCIAL

## 2023-07-12 VITALS
SYSTOLIC BLOOD PRESSURE: 108 MMHG | DIASTOLIC BLOOD PRESSURE: 64 MMHG | HEIGHT: 74 IN | HEART RATE: 46 BPM | BODY MASS INDEX: 26.44 KG/M2 | WEIGHT: 206 LBS

## 2023-07-12 DIAGNOSIS — I48.91 ATRIAL FIBRILLATION WITH RVR (HCC): ICD-10-CM

## 2023-07-12 DIAGNOSIS — R00.1 BRADYCARDIA: ICD-10-CM

## 2023-07-12 DIAGNOSIS — I48.91 ATRIAL FIBRILLATION, UNSPECIFIED TYPE (HCC): Primary | ICD-10-CM

## 2023-07-12 PROCEDURE — 99213 OFFICE O/P EST LOW 20 MIN: CPT | Performed by: INTERNAL MEDICINE

## 2023-07-12 PROCEDURE — 93000 ELECTROCARDIOGRAM COMPLETE: CPT | Performed by: INTERNAL MEDICINE

## 2023-07-12 NOTE — ASSESSMENT & PLAN NOTE
Resolved. Likely precipitated by transient high vagal tone post micturition. We will back off on Eliquis for now unless there is recurrence.

## 2023-07-12 NOTE — PROGRESS NOTES
Patient ID: Farzad Cross is a 60 y.o. male. Plan:      Atrial fibrillation with RVR ([address])  Resolved. Likely precipitated by transient high vagal tone post micturition. We will back off on Eliquis for now unless there is recurrence. Bradycardia  Drug-induced. We will stop metoprolol. Follow up Plan/Other summary comments:  Patient will continue to monitor with his Kardia device. Unless there are are recurrences follow-up with me will be as needed. HPI:   Patient is seen in follow-up having recently been hospitalized for transient atrial fibrillation with a rapid rate. Rate was controlled prior to discharge and 1 day after discharge she converted to sinus rhythm. There was clear onset by symptoms confirmed by his home monitor. No chest pain. No syncope or near syncope. Results for orders placed or performed in visit on 07/12/23   POCT ECG    Impression    Sbrady at 46 /minute. Otherwise WNL. Most recent or relevant cardiac/vascular testing:    TTE 6/13/2023: Normal LVEF. Mild LVH. History reviewed. No pertinent surgical history. Lipid Profile: No results found for: "CHOL", "TRIG", "HDL", "LDL"      Review of Systems   10  point ROS  was otherwise non pertinent or negative except as per HPI or as below. Gait: Normal.        Objective:     /64   Pulse (!) 46   Ht 6' 2" (1.88 m)   Wt 93.4 kg (206 lb)   BMI 26.45 kg/m²     PHYSICAL EXAM:    General:  Normal appearance in no distress. Eyes:  Anicteric. Oral mucosa:  Moist.  Neck:  No JVD. Carotid upstrokes are brisk without bruits. No masses. Chest:  Clear to auscultation. Cardiac:  No palpable PMI. Normal S1 and S2. No murmur gallop or rub. Abdomen:  Soft and nontender. No palpable organomegaly or aortic enlargement. Extremities:  No peripheral edema. Musculoskeletal:  Symmetric. Vascular:  Femoral pulses are brisk without bruits. Popliteal pulses are intact bilaterally.    Pedal pulses are intact. Neuro:  Grossly symmetric. Psych:  Alert and oriented x3. No current outpatient medications on file.   No Known Allergies  Past Medical History:   Diagnosis Date   • Atrial fibrillation [facility])            Social History     Tobacco Use   Smoking Status Never   Smokeless Tobacco Never